# Patient Record
Sex: MALE | Race: OTHER | Employment: OTHER | URBAN - METROPOLITAN AREA
[De-identification: names, ages, dates, MRNs, and addresses within clinical notes are randomized per-mention and may not be internally consistent; named-entity substitution may affect disease eponyms.]

---

## 2019-03-22 NOTE — PATIENT DISCUSSION
(Q99.806) Vitreous degeneration, bilateral - Assesment : Examination revealed a posterior vitreous detachment. - Plan : Monitor for changes. Advised patient to call our office with decreased vision or an increase in flashes and/or floaters.

## 2019-03-22 NOTE — PATIENT DISCUSSION
(H25.13) Age-related nuclear cataract, bilateral - Assesment : Examination revealed cataract. Mild OU. Patient is currently asymptomatic and functioning well. Discussed lens options and procedures for surgery. All questions answered. - Plan : Monitor for changes. Advised patient to call our office with decreased vision or increased symptoms. Glasses Rx updated and given.    RTC 1 year/EXAM/OCT mac

## 2019-03-22 NOTE — PATIENT DISCUSSION
(X09.5625) Nonexudative age-related macular degeneration bilateral pb - Assesment : Examination revealed  mild AMD Dry OU. - Plan : Monitor for changes. Advised patient to call our office with decreased vision or increased distortion. Patient advised to check [employer] regularly (once weekly or more) and use nutraceuticals such as AREDS 2 eye vitamins. Wear sunglasses when outdoors and eat green, leafy vegetables to maintain ocular health.

## 2021-01-26 NOTE — PATIENT DISCUSSION
Monitor for changes.    Advised patient to call our office with decreased vision or increased symptoms. Patient requested to monitor their vision for further visual changes. Return for exam in 1 year or sooner if needed.

## 2021-01-26 NOTE — PATIENT DISCUSSION
Monitor for changes.    Advised patient to call our office with decreased vision or an increase in flashes and/or floaters.

## 2021-03-24 ENCOUNTER — NEW PATIENT COMPREHENSIVE (OUTPATIENT)
Dept: URBAN - METROPOLITAN AREA CLINIC 43 | Facility: CLINIC | Age: 81
End: 2021-03-24

## 2021-03-24 DIAGNOSIS — H43.813: ICD-10-CM

## 2021-03-24 DIAGNOSIS — H25.9: ICD-10-CM

## 2021-03-24 DIAGNOSIS — H52.203: ICD-10-CM

## 2021-03-24 PROCEDURE — 92004 COMPRE OPH EXAM NEW PT 1/>: CPT

## 2021-03-24 PROCEDURE — 92015 DETERMINE REFRACTIVE STATE: CPT

## 2021-03-24 ASSESSMENT — VISUAL ACUITY
OD_CC: 20/50-1
OD_CC: J3
OD_SC: J12
OS_SC: 20/30-1
OD_SC: 20/30-2
OS_CC: J3
OS_CC: 20/30+2
OS_SC: J10

## 2021-03-24 ASSESSMENT — TONOMETRY
OS_IOP_MMHG: 15
OD_IOP_MMHG: 15

## 2022-03-22 ENCOUNTER — COMPREHENSIVE EXAM (OUTPATIENT)
Dept: URBAN - METROPOLITAN AREA CLINIC 43 | Facility: CLINIC | Age: 82
End: 2022-03-22

## 2022-03-22 DIAGNOSIS — H52.203: ICD-10-CM

## 2022-03-22 DIAGNOSIS — H43.813: ICD-10-CM

## 2022-03-22 DIAGNOSIS — H04.123: ICD-10-CM

## 2022-03-22 DIAGNOSIS — H25.9: ICD-10-CM

## 2022-03-22 PROCEDURE — 92015 DETERMINE REFRACTIVE STATE: CPT

## 2022-03-22 PROCEDURE — 92014 COMPRE OPH EXAM EST PT 1/>: CPT

## 2022-03-22 ASSESSMENT — VISUAL ACUITY
OD_CC: J3
OS_SC: J6
OS_CC: J3
OD_CC: 20/50-2
OD_SC: 20/40-1
OS_SC: 20/40
OD_PH: 20/30
OD_SC: J6
OS_CC: 20/25

## 2022-03-22 ASSESSMENT — TONOMETRY
OD_IOP_MMHG: 16
OS_IOP_MMHG: 16

## 2022-11-02 NOTE — PATIENT DISCUSSION
Monitor for changes.    Advised patient to call our office with decreased vision or increased symptoms. Patient requested to monitor their vision for further visual changes. Return for exam/mac oct in 1 year or sooner if needed.